# Patient Record
Sex: MALE | Race: WHITE
[De-identification: names, ages, dates, MRNs, and addresses within clinical notes are randomized per-mention and may not be internally consistent; named-entity substitution may affect disease eponyms.]

---

## 2018-05-10 ENCOUNTER — HOSPITAL ENCOUNTER (OUTPATIENT)
Dept: HOSPITAL 83 - RAD | Age: 73
Discharge: HOME | End: 2018-05-10
Attending: NURSE PRACTITIONER
Payer: COMMERCIAL

## 2018-05-10 DIAGNOSIS — M25.551: Primary | ICD-10-CM

## 2020-12-14 RX ORDER — ACETAMINOPHEN 325 MG/1
650 TABLET ORAL EVERY 6 HOURS PRN
COMMUNITY

## 2020-12-14 RX ORDER — AMLODIPINE BESYLATE 10 MG/1
10 TABLET ORAL DAILY
COMMUNITY

## 2020-12-14 RX ORDER — LISINOPRIL 40 MG/1
40 TABLET ORAL DAILY
COMMUNITY

## 2020-12-14 RX ORDER — DICLOFENAC SODIUM AND MISOPROSTOL 50; 200 MG/1; UG/1
1 TABLET, DELAYED RELEASE ORAL DAILY
COMMUNITY

## 2020-12-16 ENCOUNTER — ANESTHESIA EVENT (OUTPATIENT)
Dept: OPERATING ROOM | Age: 75
End: 2020-12-16
Payer: MEDICARE

## 2020-12-18 ENCOUNTER — PREP FOR PROCEDURE (OUTPATIENT)
Dept: OPHTHALMOLOGY | Age: 75
End: 2020-12-18

## 2020-12-18 RX ORDER — TROPICAMIDE 10 MG/ML
1 SOLUTION/ DROPS OPHTHALMIC SEE ADMIN INSTRUCTIONS
Status: CANCELLED | OUTPATIENT
Start: 2020-12-21

## 2020-12-18 RX ORDER — SODIUM CHLORIDE 9 MG/ML
INJECTION, SOLUTION INTRAVENOUS CONTINUOUS
Status: CANCELLED | OUTPATIENT
Start: 2020-12-18

## 2020-12-18 RX ORDER — SODIUM CHLORIDE 0.9 % (FLUSH) 0.9 %
10 SYRINGE (ML) INJECTION PRN
Status: CANCELLED | OUTPATIENT
Start: 2020-12-18

## 2020-12-18 RX ORDER — SODIUM CHLORIDE 0.9 % (FLUSH) 0.9 %
10 SYRINGE (ML) INJECTION EVERY 12 HOURS SCHEDULED
Status: CANCELLED | OUTPATIENT
Start: 2020-12-18

## 2020-12-18 RX ORDER — TOBRAMYCIN 3 MG/ML
1 SOLUTION/ DROPS OPHTHALMIC SEE ADMIN INSTRUCTIONS
Status: CANCELLED | OUTPATIENT
Start: 2020-12-21

## 2020-12-18 RX ORDER — PHENYLEPHRINE HYDROCHLORIDE 25 MG/ML
1 SOLUTION/ DROPS OPHTHALMIC SEE ADMIN INSTRUCTIONS
Status: CANCELLED | OUTPATIENT
Start: 2020-12-21

## 2020-12-18 RX ORDER — PROPARACAINE HYDROCHLORIDE 5 MG/ML
1 SOLUTION/ DROPS OPHTHALMIC SEE ADMIN INSTRUCTIONS
Status: CANCELLED | OUTPATIENT
Start: 2020-12-21

## 2020-12-18 ASSESSMENT — LIFESTYLE VARIABLES: SMOKING_STATUS: 0

## 2020-12-18 NOTE — ANESTHESIA PRE PROCEDURE
Department of Anesthesiology  Preprocedure Note       Name:  Agatha Estrada   Age:  70 y.o.  :  1945                                          MRN:  [id number]         Date:  2020      Surgeon: Dk Holly):  Daniella Milligan MD    Procedure: Procedure(s):  PARS PLANA VITRECTOMY, SILICONE OIL REMOVAL, 50C, MAC, LEFT EYE    Medications prior to admission:   Prior to Admission medications    Medication Sig Start Date End Date Taking? Authorizing Provider   amLODIPine (NORVASC) 10 MG tablet Take 10 mg by mouth daily   Yes Historical Provider, MD   lisinopril (PRINIVIL;ZESTRIL) 40 MG tablet Take 40 mg by mouth daily   Yes Historical Provider, MD   diclofenac-miSOPROStol (ARTHROTEC 50) 50-0.2 MG per tablet Take 1 tablet by mouth 2 times daily   Yes Historical Provider, MD   acetaminophen (TYLENOL) 325 MG tablet Take 650 mg by mouth every 6 hours as needed for Pain   Yes Historical Provider, MD       Current medications:    No current facility-administered medications for this encounter. Current Outpatient Medications   Medication Sig Dispense Refill    amLODIPine (NORVASC) 10 MG tablet Take 10 mg by mouth daily      lisinopril (PRINIVIL;ZESTRIL) 40 MG tablet Take 40 mg by mouth daily      diclofenac-miSOPROStol (ARTHROTEC 50) 50-0.2 MG per tablet Take 1 tablet by mouth 2 times daily      acetaminophen (TYLENOL) 325 MG tablet Take 650 mg by mouth every 6 hours as needed for Pain         Allergies:  No Known Allergies    Problem List:  There is no problem list on file for this patient.       Past Medical History:        Diagnosis Date    Arthritis     COPD (chronic obstructive pulmonary disease) ([facility] [address]     Hyperlipidemia     Hypertension        Past Surgical History:        Procedure Laterality Date    RETINAL DETACHMENT SURGERY Left     RETINAL DETACHMENT SURGERY Right     RETINAL DETACHMENT SURGERY Left 2020    and   done in [facility] [address] [facility] Right GI/Hepatic/Renal:             Endo/Other:    (+) : arthritis:., .                 Abdominal:       Abdomen: soft. Vascular:                                      Anesthesia Plan      MAC     ASA 3     (PCP clearance on chart)  Induction: intravenous. Plan discussed with CRNA. Juan Gómez MD   12/18/2020      Pt seen questions answered plan outlined H&P reviewed accepts MAC. Gabriele Campos M.D 12/21/2020 1118.

## 2020-12-18 NOTE — OP NOTE
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 Removal of silicone oil, left eye                               3. Endolaser, Photocoagulation left eye                               4. Silicone oil, left eye    COMPLICATIONS: NONE    OPERATIVE COURSE: After a history, physical and consent were obtained, the patient was taken to the operating room. The patient was given a bolus of IV sedating medication and a retrobulbar block was performed using a 1:1 mixture of 4% Lidocaine and 0.75% Marcaine to the operative eye. The eye was then prepped and draped in a sterile manner and lid speculum was inserted. A transscleral cannula was inserted in the infero-temporal quadrant 3.5 - 4.0 mm posterior to the limbus and a self-retaining infusion cannula was inserted. Additional cannulas were placed at the 2 and 10 O'clock positions. Using a light pipe and a viscous fluid kit, the silicone oil was removed from the eye. There was found to be a retinal detachment with sub-retinal fluid extended from 4 o'clock to 9 o'clock including the central macula. Scleral depression was performed for 360 degrees to ensure there were no other retinal tears. A drainage retinotomy was made posteriorly and a fluid-air exchange was performed using a soft-tipped cannula or the vitreous cutter. The retina flattened nicely. Endolaser photocoagulation was applied to  the retinotomy and around the entire periphery. The air was exchanged for silicone oil.  The three cannulas were removed and the eye maintained normal intraocular pressure by palpation. The lid speculum was removed and the eye was dressed with antibiotic ointment, eye patch and shield. The patient was taken from the operating room to the recovery room awake and in good condition. The patient will maintain a face down position sleeping on either side for 7 days. The patient will follow up in the office tomorrow for a post-operative check. Estimated blood loss for this procedure was less than 5ml.     Electronically signed by Luis Brown MD on 12/21/2020 at 1:46 PM

## 2020-12-18 NOTE — H&P
I have examined the patient and reviewed the history and physical from November 11, 2020 and find no relevant changes. I have reviewed with the patient and/or family the risks, benefits, and alternatives to the procedure and they have agreed to proceed.     KERA FLORES

## 2020-12-21 ENCOUNTER — HOSPITAL ENCOUNTER (OUTPATIENT)
Age: 75
Setting detail: OUTPATIENT SURGERY
Discharge: HOME | End: 2020-12-21
Attending: OPHTHALMOLOGY | Admitting: OPHTHALMOLOGY
Payer: MEDICARE

## 2020-12-21 ENCOUNTER — ANESTHESIA (OUTPATIENT)
Dept: OPERATING ROOM | Age: 75
End: 2020-12-21
Payer: MEDICARE

## 2020-12-21 VITALS
HEIGHT: 69 IN | BODY MASS INDEX: 32.58 KG/M2 | RESPIRATION RATE: 12 BRPM | HEART RATE: 68 BPM | DIASTOLIC BLOOD PRESSURE: 79 MMHG | OXYGEN SATURATION: 98 % | SYSTOLIC BLOOD PRESSURE: 149 MMHG | TEMPERATURE: 98.3 F | WEIGHT: 220 LBS

## 2020-12-21 VITALS
SYSTOLIC BLOOD PRESSURE: 143 MMHG | OXYGEN SATURATION: 99 % | RESPIRATION RATE: 17 BRPM | DIASTOLIC BLOOD PRESSURE: 73 MMHG

## 2020-12-21 PROBLEM — H33.012 RETINAL DETACHMENT OF LEFT EYE WITH SINGLE BREAK: Status: ACTIVE | Noted: 2020-12-21

## 2020-12-21 PROCEDURE — 3600000003 HC SURGERY LEVEL 3 BASE: Performed by: OPHTHALMOLOGY

## 2020-12-21 PROCEDURE — 6370000000 HC RX 637 (ALT 250 FOR IP): Performed by: OPHTHALMOLOGY

## 2020-12-21 PROCEDURE — 2500000003 HC RX 250 WO HCPCS: Performed by: OPHTHALMOLOGY

## 2020-12-21 PROCEDURE — 3700000001 HC ADD 15 MINUTES (ANESTHESIA): Performed by: OPHTHALMOLOGY

## 2020-12-21 PROCEDURE — 2580000003 HC RX 258: Performed by: ANESTHESIOLOGY

## 2020-12-21 PROCEDURE — C1814 RETINAL TAMP, SILICONE OIL: HCPCS | Performed by: OPHTHALMOLOGY

## 2020-12-21 PROCEDURE — 2709999900 HC NON-CHARGEABLE SUPPLY: Performed by: OPHTHALMOLOGY

## 2020-12-21 PROCEDURE — 7100000011 HC PHASE II RECOVERY - ADDTL 15 MIN: Performed by: OPHTHALMOLOGY

## 2020-12-21 PROCEDURE — 6360000002 HC RX W HCPCS: Performed by: NURSE ANESTHETIST, CERTIFIED REGISTERED

## 2020-12-21 PROCEDURE — 3600000013 HC SURGERY LEVEL 3 ADDTL 15MIN: Performed by: OPHTHALMOLOGY

## 2020-12-21 PROCEDURE — 3700000000 HC ANESTHESIA ATTENDED CARE: Performed by: OPHTHALMOLOGY

## 2020-12-21 PROCEDURE — 2720000010 HC SURG SUPPLY STERILE: Performed by: OPHTHALMOLOGY

## 2020-12-21 PROCEDURE — 7100000010 HC PHASE II RECOVERY - FIRST 15 MIN: Performed by: OPHTHALMOLOGY

## 2020-12-21 RX ORDER — BALANCED SALT SOLUTION 6.4; .75; .48; .3; 3.9; 1.7 MG/ML; MG/ML; MG/ML; MG/ML; MG/ML; MG/ML
SOLUTION OPHTHALMIC PRN
Status: DISCONTINUED | OUTPATIENT
Start: 2020-12-21 | End: 2020-12-21 | Stop reason: ALTCHOICE

## 2020-12-21 RX ORDER — SODIUM CHLORIDE 9 MG/ML
INJECTION, SOLUTION INTRAVENOUS CONTINUOUS
Status: DISCONTINUED | OUTPATIENT
Start: 2020-12-21 | End: 2020-12-21 | Stop reason: HOSPADM

## 2020-12-21 RX ORDER — TOBRAMYCIN 3 MG/ML
1 SOLUTION/ DROPS OPHTHALMIC SEE ADMIN INSTRUCTIONS
Status: DISCONTINUED | OUTPATIENT
Start: 2020-12-21 | End: 2020-12-21 | Stop reason: HOSPADM

## 2020-12-21 RX ORDER — TROPICAMIDE 10 MG/ML
1 SOLUTION/ DROPS OPHTHALMIC SEE ADMIN INSTRUCTIONS
Status: DISCONTINUED | OUTPATIENT
Start: 2020-12-21 | End: 2020-12-21 | Stop reason: HOSPADM

## 2020-12-21 RX ORDER — SODIUM CHLORIDE 0.9 % (FLUSH) 0.9 %
10 SYRINGE (ML) INJECTION EVERY 12 HOURS SCHEDULED
Status: DISCONTINUED | OUTPATIENT
Start: 2020-12-21 | End: 2020-12-21 | Stop reason: HOSPADM

## 2020-12-21 RX ORDER — SODIUM CHLORIDE, SODIUM LACTATE, POTASSIUM CHLORIDE, CALCIUM CHLORIDE 600; 310; 30; 20 MG/100ML; MG/100ML; MG/100ML; MG/100ML
INJECTION, SOLUTION INTRAVENOUS CONTINUOUS
Status: DISCONTINUED | OUTPATIENT
Start: 2020-12-21 | End: 2020-12-21 | Stop reason: HOSPADM

## 2020-12-21 RX ORDER — PROPARACAINE HYDROCHLORIDE 5 MG/ML
1 SOLUTION/ DROPS OPHTHALMIC SEE ADMIN INSTRUCTIONS
Status: DISCONTINUED | OUTPATIENT
Start: 2020-12-21 | End: 2020-12-21 | Stop reason: HOSPADM

## 2020-12-21 RX ORDER — TETRACAINE HYDROCHLORIDE 5 MG/ML
SOLUTION OPHTHALMIC PRN
Status: DISCONTINUED | OUTPATIENT
Start: 2020-12-21 | End: 2020-12-21 | Stop reason: ALTCHOICE

## 2020-12-21 RX ORDER — PHENYLEPHRINE HCL 2.5 %
1 DROPS OPHTHALMIC (EYE) SEE ADMIN INSTRUCTIONS
Status: DISCONTINUED | OUTPATIENT
Start: 2020-12-21 | End: 2020-12-21 | Stop reason: HOSPADM

## 2020-12-21 RX ORDER — NEOMYCIN SULFATE, POLYMYXIN B SULFATE, AND DEXAMETHASONE 3.5; 10000; 1 MG/G; [USP'U]/G; MG/G
OINTMENT OPHTHALMIC PRN
Status: DISCONTINUED | OUTPATIENT
Start: 2020-12-21 | End: 2020-12-21 | Stop reason: ALTCHOICE

## 2020-12-21 RX ORDER — FENTANYL CITRATE 50 UG/ML
INJECTION, SOLUTION INTRAMUSCULAR; INTRAVENOUS PRN
Status: DISCONTINUED | OUTPATIENT
Start: 2020-12-21 | End: 2020-12-21 | Stop reason: SDUPTHER

## 2020-12-21 RX ORDER — MIDAZOLAM HYDROCHLORIDE 1 MG/ML
INJECTION INTRAMUSCULAR; INTRAVENOUS PRN
Status: DISCONTINUED | OUTPATIENT
Start: 2020-12-21 | End: 2020-12-21 | Stop reason: SDUPTHER

## 2020-12-21 RX ORDER — SODIUM CHLORIDE 0.9 % (FLUSH) 0.9 %
10 SYRINGE (ML) INJECTION PRN
Status: DISCONTINUED | OUTPATIENT
Start: 2020-12-21 | End: 2020-12-21 | Stop reason: HOSPADM

## 2020-12-21 RX ADMIN — MIDAZOLAM 2 MG: 1 INJECTION INTRAMUSCULAR; INTRAVENOUS at 13:04

## 2020-12-21 RX ADMIN — FENTANYL CITRATE 25 MCG: 50 INJECTION, SOLUTION INTRAMUSCULAR; INTRAVENOUS at 13:12

## 2020-12-21 RX ADMIN — FENTANYL CITRATE 25 MCG: 50 INJECTION, SOLUTION INTRAMUSCULAR; INTRAVENOUS at 13:17

## 2020-12-21 RX ADMIN — FENTANYL CITRATE 50 MCG: 50 INJECTION, SOLUTION INTRAMUSCULAR; INTRAVENOUS at 13:08

## 2020-12-21 RX ADMIN — SODIUM CHLORIDE, POTASSIUM CHLORIDE, SODIUM LACTATE AND CALCIUM CHLORIDE: 600; 310; 30; 20 INJECTION, SOLUTION INTRAVENOUS at 13:02

## 2020-12-21 RX ADMIN — SODIUM CHLORIDE, POTASSIUM CHLORIDE, SODIUM LACTATE AND CALCIUM CHLORIDE: 600; 310; 30; 20 INJECTION, SOLUTION INTRAVENOUS at 10:54

## 2020-12-21 ASSESSMENT — PULMONARY FUNCTION TESTS
PIF_VALUE: 0

## 2020-12-21 ASSESSMENT — PAIN SCALES - GENERAL
PAINLEVEL_OUTOF10: 0
PAINLEVEL_OUTOF10: 0

## 2020-12-21 ASSESSMENT — PAIN - FUNCTIONAL ASSESSMENT: PAIN_FUNCTIONAL_ASSESSMENT: 0-10

## 2020-12-21 NOTE — ANESTHESIA POSTPROCEDURE EVALUATION
Department of Anesthesiology  Postprocedure Note    Patient: Catherine Kamara  MRN: [id number]  YOB: 1945  Date of evaluation: 12/21/2020  Time:  1:54 PM     Procedure Summary     Date: 12/21/20 Room / Location: [address] [place] [address] 02 / [address]    Anesthesia Start: 0519 Anesthesia Stop: 4198    Procedure: PARS PLANA VITRECTOMY, SILICONE OIL REMOVAL, 79Y, MAC, LEFT EYE (Left Eye) Diagnosis: (RETINA DETACHMENT LEFT EYE)    Surgeons: Luciano Latham MD Responsible Provider: April Patel MD    Anesthesia Type: MAC ASA Status: 3          Anesthesia Type: MAC    Yovani Phase I: Yovani Score: 10    Yovani Phase II:      Last vitals: Reviewed and per EMR flowsheets.        Anesthesia Post Evaluation    Patient location during evaluation: PACU  Patient participation: complete - patient participated  Level of consciousness: awake  Pain score: 3  Airway patency: patent  Nausea & Vomiting: no nausea  Complications: no  Cardiovascular status: blood pressure returned to baseline  Respiratory status: acceptable  Hydration status: euvolemic

## 2021-12-14 RX ORDER — ATORVASTATIN CALCIUM 40 MG/1
40 TABLET, FILM COATED ORAL DAILY
COMMUNITY

## 2021-12-15 ENCOUNTER — PREP FOR PROCEDURE (OUTPATIENT)
Dept: OPHTHALMOLOGY | Age: 76
End: 2021-12-15

## 2021-12-15 RX ORDER — PHENYLEPHRINE HCL 2.5 %
1 DROPS OPHTHALMIC (EYE) SEE ADMIN INSTRUCTIONS
Status: CANCELLED | OUTPATIENT
Start: 2021-12-20

## 2021-12-15 RX ORDER — OFLOXACIN 3 MG/ML
1 SOLUTION/ DROPS OPHTHALMIC ONCE
Status: CANCELLED | OUTPATIENT
Start: 2021-12-20

## 2021-12-15 RX ORDER — TROPICAMIDE 10 MG/ML
1 SOLUTION/ DROPS OPHTHALMIC SEE ADMIN INSTRUCTIONS
Status: CANCELLED | OUTPATIENT
Start: 2021-12-20

## 2021-12-15 RX ORDER — SODIUM CHLORIDE 0.9 % (FLUSH) 0.9 %
5-40 SYRINGE (ML) INJECTION PRN
Status: CANCELLED | OUTPATIENT
Start: 2021-12-15

## 2021-12-15 RX ORDER — PROPARACAINE HYDROCHLORIDE 5 MG/ML
1 SOLUTION/ DROPS OPHTHALMIC SEE ADMIN INSTRUCTIONS
Status: CANCELLED | OUTPATIENT
Start: 2021-12-20

## 2021-12-15 RX ORDER — SODIUM CHLORIDE 0.9 % (FLUSH) 0.9 %
5-40 SYRINGE (ML) INJECTION EVERY 12 HOURS SCHEDULED
Status: CANCELLED | OUTPATIENT
Start: 2021-12-15

## 2021-12-15 RX ORDER — SODIUM CHLORIDE 9 MG/ML
25 INJECTION, SOLUTION INTRAVENOUS PRN
Status: CANCELLED | OUTPATIENT
Start: 2021-12-15

## 2021-12-17 ENCOUNTER — ANESTHESIA EVENT (OUTPATIENT)
Dept: OPERATING ROOM | Age: 76
End: 2021-12-17
Payer: MEDICARE

## 2021-12-17 ASSESSMENT — LIFESTYLE VARIABLES: SMOKING_STATUS: 0

## 2021-12-17 ASSESSMENT — COPD QUESTIONNAIRES: CAT_SEVERITY: MILD

## 2021-12-17 NOTE — ANESTHESIA PRE PROCEDURE
Department of Anesthesiology  Preprocedure Note       Name:  Audrey Scruggs   Age:  78 y.o.  :  1945                                          MRN:  [id number]         Date:  2021      Surgeon: Dionna Blank):  Magui Sidhu MD    Procedure: Procedure(s):  PARS PLANA VITRECTOMY REMOVAL OF SILICONE OIL 20G MAC LEFT EYE (54813)    Medications prior to admission:   Prior to Admission medications    Medication Sig Start Date End Date Taking? Authorizing Provider   Fluticasone Furoate-Vilanterol (BREO ELLIPTA IN) Inhale into the lungs daily    Historical Provider, MD   atorvastatin (LIPITOR) 40 MG tablet Take 40 mg by mouth daily    Historical Provider, MD   amLODIPine (NORVASC) 10 MG tablet Take 10 mg by mouth daily    Historical Provider, MD   lisinopril (PRINIVIL;ZESTRIL) 40 MG tablet Take 40 mg by mouth daily    Historical Provider, MD   diclofenac-miSOPROStol (ARTHROTEC 50) 50-0.2 MG per tablet Take 1 tablet by mouth daily     Historical Provider, MD   acetaminophen (TYLENOL) 325 MG tablet Take 650 mg by mouth every 6 hours as needed for Pain    Historical Provider, MD       Current medications:    Current Outpatient Medications   Medication Sig Dispense Refill    Fluticasone Furoate-Vilanterol (BREO ELLIPTA IN) Inhale into the lungs daily      atorvastatin (LIPITOR) 40 MG tablet Take 40 mg by mouth daily      amLODIPine (NORVASC) 10 MG tablet Take 10 mg by mouth daily      lisinopril (PRINIVIL;ZESTRIL) 40 MG tablet Take 40 mg by mouth daily      diclofenac-miSOPROStol (ARTHROTEC 50) 50-0.2 MG per tablet Take 1 tablet by mouth daily       acetaminophen (TYLENOL) 325 MG tablet Take 650 mg by mouth every 6 hours as needed for Pain       No current facility-administered medications for this visit.        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 [address] Left     RETINAL DETACHMENT SURGERY Right 2019    RETINAL DETACHMENT SURGERY Left 2020    and   done in [address] [place], [place] [address]e.    in arm      VITRECTOMY Left 2020       Social History:    Social History     Tobacco Use    Smoking status: Former Smoker     Quit date:      Years since quittin.9    Smokeless tobacco: Current User     Types: Snuff   Substance Use Topics    Alcohol use: Yes     Comment: occ                                Ready to quit: Not Answered  Counseling given: Not Answered      Vital Signs (Current): There were no vitals filed for this visit. BP Readings from Last 3 Encounters:   20 (!) 143/73   20 (!) 149/79       NPO Status:  >8.H                                                                               BMI:   Wt Readings from Last 3 Encounters:   20 220 lb (99.8 kg)     There is no height or weight on file to calculate BMI.    CBC: No results found for: WBC, RBC, HGB, HCT, MCV, RDW, PLT    CMP: No results found for: NA, K, CL, CO2, BUN, CREATININE, GFRAA, AGRATIO, LABGLOM, GLUCOSE, PROT, CALCIUM, BILITOT, ALKPHOS, AST, ALT    POC Tests: No results for input(s): POCGLU, POCNA, POCK, POCCL, POCBUN, POCHEMO, POCHCT in the last 72 hours.     Coags: No results found for: PROTIME, INR, APTT    HCG (If Applicable): No results found for: PREGTESTUR, PREGSERUM, HCG, HCGQUANT     ABGs: No results found for: PHART, PO2ART, UER3OZZ, RJY1HCT, BEART, H3PTJCBY     Type & Screen (If Applicable):  No results found for: LABABO, LABRH    Drug/Infectious Status (If Applicable):  No results found for: HIV, HEPCAB    COVID-19 Screening (If Applicable): No results found for: COVID19      Anesthesia Evaluation  Patient summary reviewed and Nursing notes reviewed no history of anesthetic complications:   Airway: Mallampati: III  TM distance: >3 FB   Neck ROM: full  Mouth opening: > = 3 FB Dental:          Pulmonary: breath sounds clear to auscultation  (+) COPD: mild,  sleep apnea: on CPAP,      (-) not a current smoker (ex smoker)                           Cardiovascular:  Exercise tolerance: good (>4 METS),   (+) hypertension: mild, hyperlipidemia        Rhythm: regular  Rate: normal           Beta Blocker:  Not on Beta Blocker      ROS comment: Cleared by PCP     Neuro/Psych:   Negative Neuro/Psych ROS              GI/Hepatic/Renal: Neg GI/Hepatic/Renal ROS            Endo/Other:    (+) : arthritis: OA., . Pt had no PAT visit       Abdominal:   (+) obese,     Abdomen: soft. Vascular: negative vascular ROS. Other Findings:             Anesthesia Plan      MAC     ASA 3     (Note copied from a previous encounter with the appropriate addendums and changes)  Induction: intravenous. Anesthetic plan and risks discussed with patient. Plan discussed with CRNA. Attending anesthesiologist reviewed and agrees with Preprocedure content            818 Serg Gunter, DO   12/17/2021      History, data, and pertinent studies from chart review. Above represents information available via the shared medical record including previous anesthetic, medication, and allergy history.   Confirmation of above and final disposition per DOS anesthesiologist.    [facility] Serg Gunter, DO  Staff Anesthesiologist  December 17, 2021  7:07 AM

## 2021-12-20 ENCOUNTER — ANESTHESIA (OUTPATIENT)
Dept: OPERATING ROOM | Age: 76
End: 2021-12-20
Payer: MEDICARE

## 2021-12-20 ENCOUNTER — HOSPITAL ENCOUNTER (OUTPATIENT)
Age: 76
Setting detail: OUTPATIENT SURGERY
Discharge: HOME | End: 2021-12-20
Attending: OPHTHALMOLOGY | Admitting: OPHTHALMOLOGY
Payer: MEDICARE

## 2021-12-20 VITALS
SYSTOLIC BLOOD PRESSURE: 137 MMHG | RESPIRATION RATE: 16 BRPM | HEART RATE: 71 BPM | OXYGEN SATURATION: 98 % | WEIGHT: 242 LBS | TEMPERATURE: 98 F | BODY MASS INDEX: 34.65 KG/M2 | HEIGHT: 70 IN | DIASTOLIC BLOOD PRESSURE: 67 MMHG

## 2021-12-20 VITALS
SYSTOLIC BLOOD PRESSURE: 159 MMHG | RESPIRATION RATE: 19 BRPM | OXYGEN SATURATION: 99 % | DIASTOLIC BLOOD PRESSURE: 80 MMHG

## 2021-12-20 PROCEDURE — 3600000012 HC SURGERY LEVEL 2 ADDTL 15MIN: Performed by: OPHTHALMOLOGY

## 2021-12-20 PROCEDURE — 6360000002 HC RX W HCPCS: Performed by: NURSE ANESTHETIST, CERTIFIED REGISTERED

## 2021-12-20 PROCEDURE — 6370000000 HC RX 637 (ALT 250 FOR IP): Performed by: OPHTHALMOLOGY

## 2021-12-20 PROCEDURE — 7100000011 HC PHASE II RECOVERY - ADDTL 15 MIN: Performed by: OPHTHALMOLOGY

## 2021-12-20 PROCEDURE — 7100000010 HC PHASE II RECOVERY - FIRST 15 MIN: Performed by: OPHTHALMOLOGY

## 2021-12-20 PROCEDURE — 3700000000 HC ANESTHESIA ATTENDED CARE: Performed by: OPHTHALMOLOGY

## 2021-12-20 PROCEDURE — 2500000003 HC RX 250 WO HCPCS: Performed by: OPHTHALMOLOGY

## 2021-12-20 PROCEDURE — 2580000003 HC RX 258: Performed by: ANESTHESIOLOGY

## 2021-12-20 PROCEDURE — 2709999900 HC NON-CHARGEABLE SUPPLY: Performed by: OPHTHALMOLOGY

## 2021-12-20 PROCEDURE — 3700000001 HC ADD 15 MINUTES (ANESTHESIA): Performed by: OPHTHALMOLOGY

## 2021-12-20 PROCEDURE — 3600000002 HC SURGERY LEVEL 2 BASE: Performed by: OPHTHALMOLOGY

## 2021-12-20 RX ORDER — MIDAZOLAM HYDROCHLORIDE 1 MG/ML
INJECTION INTRAMUSCULAR; INTRAVENOUS PRN
Status: DISCONTINUED | OUTPATIENT
Start: 2021-12-20 | End: 2021-12-20 | Stop reason: SDUPTHER

## 2021-12-20 RX ORDER — SODIUM CHLORIDE, SODIUM LACTATE, POTASSIUM CHLORIDE, CALCIUM CHLORIDE 600; 310; 30; 20 MG/100ML; MG/100ML; MG/100ML; MG/100ML
INJECTION, SOLUTION INTRAVENOUS CONTINUOUS
Status: DISCONTINUED | OUTPATIENT
Start: 2021-12-20 | End: 2021-12-20 | Stop reason: HOSPADM

## 2021-12-20 RX ORDER — FENTANYL CITRATE 50 UG/ML
50 INJECTION, SOLUTION INTRAMUSCULAR; INTRAVENOUS EVERY 5 MIN PRN
Status: DISCONTINUED | OUTPATIENT
Start: 2021-12-20 | End: 2021-12-20 | Stop reason: HOSPADM

## 2021-12-20 RX ORDER — PHENYLEPHRINE HCL 2.5 %
1 DROPS OPHTHALMIC (EYE) SEE ADMIN INSTRUCTIONS
Status: DISCONTINUED | OUTPATIENT
Start: 2021-12-20 | End: 2021-12-20 | Stop reason: HOSPADM

## 2021-12-20 RX ORDER — SODIUM CHLORIDE 0.9 % (FLUSH) 0.9 %
5-40 SYRINGE (ML) INJECTION PRN
Status: DISCONTINUED | OUTPATIENT
Start: 2021-12-20 | End: 2021-12-20 | Stop reason: HOSPADM

## 2021-12-20 RX ORDER — HYDRALAZINE HYDROCHLORIDE 20 MG/ML
5 INJECTION INTRAMUSCULAR; INTRAVENOUS EVERY 10 MIN PRN
Status: DISCONTINUED | OUTPATIENT
Start: 2021-12-20 | End: 2021-12-20 | Stop reason: HOSPADM

## 2021-12-20 RX ORDER — TROPICAMIDE 10 MG/ML
1 SOLUTION/ DROPS OPHTHALMIC SEE ADMIN INSTRUCTIONS
Status: DISCONTINUED | OUTPATIENT
Start: 2021-12-20 | End: 2021-12-20 | Stop reason: HOSPADM

## 2021-12-20 RX ORDER — MORPHINE SULFATE 2 MG/ML
1 INJECTION, SOLUTION INTRAMUSCULAR; INTRAVENOUS EVERY 5 MIN PRN
Status: DISCONTINUED | OUTPATIENT
Start: 2021-12-20 | End: 2021-12-20 | Stop reason: HOSPADM

## 2021-12-20 RX ORDER — TETRACAINE HYDROCHLORIDE 5 MG/ML
SOLUTION OPHTHALMIC PRN
Status: DISCONTINUED | OUTPATIENT
Start: 2021-12-20 | End: 2021-12-20 | Stop reason: ALTCHOICE

## 2021-12-20 RX ORDER — LABETALOL HYDROCHLORIDE 5 MG/ML
5 INJECTION, SOLUTION INTRAVENOUS EVERY 10 MIN PRN
Status: DISCONTINUED | OUTPATIENT
Start: 2021-12-20 | End: 2021-12-20 | Stop reason: HOSPADM

## 2021-12-20 RX ORDER — SODIUM CHLORIDE 9 MG/ML
25 INJECTION, SOLUTION INTRAVENOUS PRN
Status: DISCONTINUED | OUTPATIENT
Start: 2021-12-20 | End: 2021-12-20 | Stop reason: HOSPADM

## 2021-12-20 RX ORDER — NEOMYCIN SULFATE, POLYMYXIN B SULFATE, AND DEXAMETHASONE 3.5; 10000; 1 MG/G; [USP'U]/G; MG/G
OINTMENT OPHTHALMIC PRN
Status: DISCONTINUED | OUTPATIENT
Start: 2021-12-20 | End: 2021-12-20 | Stop reason: ALTCHOICE

## 2021-12-20 RX ORDER — MEPERIDINE HYDROCHLORIDE 25 MG/ML
12.5 INJECTION INTRAMUSCULAR; INTRAVENOUS; SUBCUTANEOUS EVERY 5 MIN PRN
Status: DISCONTINUED | OUTPATIENT
Start: 2021-12-20 | End: 2021-12-20 | Stop reason: HOSPADM

## 2021-12-20 RX ORDER — HYDROCODONE BITARTRATE AND ACETAMINOPHEN 5; 325 MG/1; MG/1
1 TABLET ORAL PRN
Status: DISCONTINUED | OUTPATIENT
Start: 2021-12-20 | End: 2021-12-20 | Stop reason: HOSPADM

## 2021-12-20 RX ORDER — FENTANYL CITRATE 50 UG/ML
INJECTION, SOLUTION INTRAMUSCULAR; INTRAVENOUS PRN
Status: DISCONTINUED | OUTPATIENT
Start: 2021-12-20 | End: 2021-12-20 | Stop reason: SDUPTHER

## 2021-12-20 RX ORDER — BALANCED SALT SOLUTION 6.4; .75; .48; .3; 3.9; 1.7 MG/ML; MG/ML; MG/ML; MG/ML; MG/ML; MG/ML
SOLUTION OPHTHALMIC PRN
Status: DISCONTINUED | OUTPATIENT
Start: 2021-12-20 | End: 2021-12-20 | Stop reason: ALTCHOICE

## 2021-12-20 RX ORDER — PROPARACAINE HYDROCHLORIDE 5 MG/ML
1 SOLUTION/ DROPS OPHTHALMIC SEE ADMIN INSTRUCTIONS
Status: COMPLETED | OUTPATIENT
Start: 2021-12-20 | End: 2021-12-20

## 2021-12-20 RX ORDER — HYDROCODONE BITARTRATE AND ACETAMINOPHEN 5; 325 MG/1; MG/1
2 TABLET ORAL PRN
Status: DISCONTINUED | OUTPATIENT
Start: 2021-12-20 | End: 2021-12-20 | Stop reason: HOSPADM

## 2021-12-20 RX ORDER — DIPHENHYDRAMINE HYDROCHLORIDE 50 MG/ML
12.5 INJECTION INTRAMUSCULAR; INTRAVENOUS
Status: DISCONTINUED | OUTPATIENT
Start: 2021-12-20 | End: 2021-12-20 | Stop reason: HOSPADM

## 2021-12-20 RX ORDER — OFLOXACIN 3 MG/ML
1 SOLUTION/ DROPS OPHTHALMIC ONCE
Status: COMPLETED | OUTPATIENT
Start: 2021-12-20 | End: 2021-12-20

## 2021-12-20 RX ORDER — PROMETHAZINE HYDROCHLORIDE 25 MG/ML
25 INJECTION, SOLUTION INTRAMUSCULAR; INTRAVENOUS
Status: DISCONTINUED | OUTPATIENT
Start: 2021-12-20 | End: 2021-12-20 | Stop reason: HOSPADM

## 2021-12-20 RX ORDER — SODIUM CHLORIDE 0.9 % (FLUSH) 0.9 %
5-40 SYRINGE (ML) INJECTION EVERY 12 HOURS SCHEDULED
Status: DISCONTINUED | OUTPATIENT
Start: 2021-12-20 | End: 2021-12-20 | Stop reason: HOSPADM

## 2021-12-20 RX ADMIN — OFLOXACIN 1 DROP: 3 SOLUTION OPHTHALMIC at 08:53

## 2021-12-20 RX ADMIN — MIDAZOLAM 1 MG: 1 INJECTION INTRAMUSCULAR; INTRAVENOUS at 10:19

## 2021-12-20 RX ADMIN — PROPARACAINE HYDROCHLORIDE 1 DROP: 5 SOLUTION/ DROPS OPHTHALMIC at 08:30

## 2021-12-20 RX ADMIN — SODIUM CHLORIDE, POTASSIUM CHLORIDE, SODIUM LACTATE AND CALCIUM CHLORIDE: 600; 310; 30; 20 INJECTION, SOLUTION INTRAVENOUS at 08:52

## 2021-12-20 RX ADMIN — TROPICAMIDE 1 DROP: 10 SOLUTION/ DROPS OPHTHALMIC at 08:30

## 2021-12-20 RX ADMIN — PHENYLEPHRINE HYDROCHLORIDE 1 DROP: 25 SOLUTION/ DROPS OPHTHALMIC at 08:30

## 2021-12-20 RX ADMIN — FENTANYL CITRATE 50 MCG: 50 INJECTION, SOLUTION INTRAMUSCULAR; INTRAVENOUS at 10:24

## 2021-12-20 ASSESSMENT — PAIN - FUNCTIONAL ASSESSMENT: PAIN_FUNCTIONAL_ASSESSMENT: 0-10

## 2021-12-20 ASSESSMENT — PAIN SCALES - GENERAL
PAINLEVEL_OUTOF10: 0

## 2021-12-20 NOTE — ANESTHESIA POSTPROCEDURE EVALUATION
Department of Anesthesiology  Postprocedure Note    Patient: Kerrie Sawyer  MRN: [id number]  YOB: 1945  Date of evaluation: 12/20/2021  Time:  11:16 AM     Procedure Summary     Date: 12/20/21 Room / Location: [address] [place] [address] 02 / [address] [facility]    Anesthesia Start: 5193 Anesthesia Stop: 3977    Procedure: PARS PLANA VITRECTOMY REMOVAL OF SILICONE OIL 62S MAC LEFT EYE (94850) (Left ) Diagnosis: (RHEG RETINA DETACHEMENT LEFT EYE)    Surgeons: Wanda Alberts MD Responsible Provider: Jana Heller MD    Anesthesia Type: MAC ASA Status: 3          Anesthesia Type: MAC    Yovani Phase I:      Yovani Phase II: Yovani Score: 10    Last vitals: Reviewed and per EMR flowsheets.        Anesthesia Post Evaluation    Patient location during evaluation: PACU  Patient participation: complete - patient participated  Level of consciousness: awake and alert  Airway patency: patent  Nausea & Vomiting: no nausea and no vomiting  Complications: no  Cardiovascular status: hemodynamically stable  Respiratory status: room air and spontaneous ventilation  Hydration status: stable

## 2021-12-20 NOTE — H&P
I have examined the patient and reviewed the history and physical from 12/1/21 and find no relevant changes. I have reviewed with the patient and/or family the risks, benefits, and alternatives to the procedure and they have agreed to proceed.     Shadia Sheehan MD

## 2022-04-21 ENCOUNTER — PREP FOR PROCEDURE (OUTPATIENT)
Dept: OPHTHALMOLOGY | Age: 77
End: 2022-04-21

## 2022-04-21 RX ORDER — PROPARACAINE HYDROCHLORIDE 5 MG/ML
1 SOLUTION/ DROPS OPHTHALMIC SEE ADMIN INSTRUCTIONS
Status: CANCELLED | OUTPATIENT
Start: 2022-04-25

## 2022-04-21 RX ORDER — TROPICAMIDE 10 MG/ML
1 SOLUTION/ DROPS OPHTHALMIC SEE ADMIN INSTRUCTIONS
Status: CANCELLED | OUTPATIENT
Start: 2022-04-25

## 2022-04-21 RX ORDER — OFLOXACIN 3 MG/ML
1 SOLUTION/ DROPS OPHTHALMIC ONCE
Status: CANCELLED | OUTPATIENT
Start: 2022-04-25

## 2022-04-21 RX ORDER — SODIUM CHLORIDE 9 MG/ML
INJECTION, SOLUTION INTRAVENOUS PRN
Status: CANCELLED | OUTPATIENT
Start: 2022-04-21

## 2022-04-21 RX ORDER — SODIUM CHLORIDE 0.9 % (FLUSH) 0.9 %
5-40 SYRINGE (ML) INJECTION EVERY 12 HOURS SCHEDULED
Status: CANCELLED | OUTPATIENT
Start: 2022-04-21

## 2022-04-21 RX ORDER — SODIUM CHLORIDE 0.9 % (FLUSH) 0.9 %
5-40 SYRINGE (ML) INJECTION PRN
Status: CANCELLED | OUTPATIENT
Start: 2022-04-21

## 2022-04-21 RX ORDER — PHENYLEPHRINE HCL 2.5 %
1 DROPS OPHTHALMIC (EYE) SEE ADMIN INSTRUCTIONS
Status: CANCELLED | OUTPATIENT
Start: 2022-04-25

## 2022-04-21 NOTE — OP NOTE
Operative Note      Patient: Yesenia Black  YOB: 1945  MRN: [id number]    Date of Procedure: 4/25/2022  Assistant: Cee Sevilla PA-C    Preoperative Diagnosis: Aphakia, Left eye    Postoperative Diagnosis: Same    Procedure Performed:  1. 25g Pars Plana Vitrectomy, Left eye                                   2. Peripheral Iridectomy, Left eye                                   3. Insertion of Anterior Chamber lens, Left eye    Complications: None  Blood Loss: <5ml  Anesthesia Used: Monitored Anesthesia Care    Operative Course: After history, physical, and consent was obtained, the patient was taken to the operating room and given a bolus of IV sedating medication. The patient then received a retrobulbar injection of a solution that contained Lidocaine and bupivacaine. The operative eye was then prepped and draped in the usual sterile fashion. Lid speculum was placed between the eyelids. The transscleral cannulas were inserted in the usual location, two temporally and one superonasally at 3 to 3.5 mm posterior to the limbus. An infusion cannula was inserted inferotemporally. The light pipe and vitrector handpiece were inserted in the eye and a core vitrectomy was performed. A posterior hyaloid separation was confirmed. A keratome blade was used to create a three planar corneal incision superior to the limbus and was extended to 6mm after measuring with callipers. Occucoat was then instilled into the eye. No retinal holes or detachments were noted. Miostat was then instilled into the eye, a Peripheral Iridectomy was then created at the 12:00 position. Occucoat was then re-instilled into the anterior chamber and a 17.0  MTA3UO was placed into the eye and rotated such that the haptics were temporal and nasal away from the corneal incision. The corneal sutures were placed superiorly and the eye was found to be water tight. The transscleral cannulas were removed and the wounds were self-sealing.  The eye was dressed with Maxitrol ointment, a pressure patch and Alvarado shield. The patient was taken from the operating room to recovery awake and in good condition. The patient will follow up for a postoperative check in the office.

## 2022-04-22 ENCOUNTER — ANESTHESIA EVENT (OUTPATIENT)
Dept: OPERATING ROOM | Age: 77
End: 2022-04-22
Payer: MEDICARE

## 2022-04-22 ASSESSMENT — LIFESTYLE VARIABLES: SMOKING_STATUS: 0

## 2022-04-22 ASSESSMENT — COPD QUESTIONNAIRES: CAT_SEVERITY: MILD

## 2022-04-22 NOTE — ANESTHESIA PRE PROCEDURE
Department of Anesthesiology  Preprocedure Note       Name:  Balwinder Frances   Age:  78 y.o.  :  1945                                          MRN:  [id number]         Date:  2022      Surgeon: Vernon Ortega):  Alia Lenz MD    Procedure: Procedure(s):  PARS PLANA VITRECTOMY, ISERTION OF SULCUS LENS VERSE ACIOL, 25G, MAC, LEFT EYE (Lens needed is +17.0 MTA2U0 ACIOL) (CPT 98864)    Medications prior to admission:   Prior to Admission medications    Medication Sig Start Date End Date Taking?  Authorizing Provider   Coenzyme Q10 (CO Q 10 PO) Take 100 mg by mouth   Yes Historical Provider, MD   Fluticasone Furoate-Vilanterol (BREO ELLIPTA IN) Inhale into the lungs daily    Historical Provider, MD   atorvastatin (LIPITOR) 40 MG tablet Take 40 mg by mouth daily    Historical Provider, MD   amLODIPine (NORVASC) 10 MG tablet Take 10 mg by mouth daily    Historical Provider, MD   lisinopril (PRINIVIL;ZESTRIL) 40 MG tablet Take 40 mg by mouth daily    Historical Provider, MD   diclofenac-miSOPROStol (ARTHROTEC 50) 50-0.2 MG per tablet Take 1 tablet by mouth daily     Historical Provider, MD   acetaminophen (TYLENOL) 325 MG tablet Take 650 mg by mouth every 6 hours as needed for Pain    Historical Provider, MD       Current medications:    Current Facility-Administered Medications   Medication Dose Route Frequency Provider Last Rate Last Admin    lactated ringers infusion   IntraVENous Continuous John Bliss MD        0.9 % sodium chloride infusion   IntraVENous PRN Alia Lenz MD        phenylephrine (MYDFRIN) 2.5 % ophthalmic solution 1 drop  1 drop Left Eye See Admin Instructions Alia Lenz MD   1 drop at 22 0757    sodium chloride flush 0.9 % injection 5-40 mL  5-40 mL IntraVENous 2 times per day Alia Lenz MD        sodium chloride flush 0.9 % injection 5-40 mL  5-40 mL IntraVENous PRN Alia Lenz MD        tropicamide (MYDRIACYL) 1 % ophthalmic solution 1 drop  1 drop Left Eye See Samaria Shaw MD   1 drop at 22 0757       Allergies:  No Known Allergies    Problem List:    Patient Active Problem List   Diagnosis Code    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    in arm      VITRECTOMY Left 2020    VITRECTOMY Left 2021    PARS PLANA VITRECTOMY REMOVAL OF SILICONE OIL 09M MAC LEFT EYE (84083) performed by Jaja Fajardo MD at [address] History:    Social History     Tobacco Use    Smoking status: Former Smoker     Quit date:      Years since quittin.3    Smokeless tobacco: Current User     Types: Snuff   Substance Use Topics    Alcohol use: Yes     Comment: occ                                Ready to quit: Not Answered  Counseling given: Not Answered      Vital Signs (Current):   Vitals:    22 1134 22 0739   BP:  (!) 159/70   Pulse:  67   Resp:  16   Temp:  97.6 °F (36.4 °C)   TempSrc:  Temporal   SpO2:  96%   Weight: 240 lb (108.9 kg) 237 lb 9.6 oz (107.8 kg)   Height: 5' 10\" (1.778 m) 5' 10\" (1.778 m)                                              BP Readings from Last 3 Encounters:   22 (!) 159/70   21 (!) 159/80   21 137/67       NPO Status: Time of last liquid consumption: 0600 (sips with meds)>8. H                        Time of last solid consumption: 1800 Date of last liquid consumption: 04/25/22                        Date of last solid food consumption: 04/24/22    BMI:   Wt Readings from Last 3 Encounters:   04/25/22 237 lb 9.6 oz (107.8 kg)   12/14/21 242 lb (109.8 kg)   12/14/20 220 lb (99.8 kg)     Body mass index is 34.09 kg/m². CBC: No results found for: WBC, RBC, HGB, HCT, MCV, RDW, PLT    CMP: No results found for: NA, K, CL, CO2, BUN, CREATININE, GFRAA, AGRATIO, LABGLOM, GLUCOSE, GLU, PROT, CALCIUM, BILITOT, ALKPHOS, AST, ALT    POC Tests: No results for input(s): POCGLU, POCNA, POCK, POCCL, POCBUN, POCHEMO, POCHCT in the last 72 hours. Coags: No results found for: PROTIME, INR, APTT    HCG (If Applicable): No results found for: PREGTESTUR, PREGSERUM, HCG, HCGQUANT     ABGs: No results found for: PHART, PO2ART, AZH1ZAL, MMZ5PPK, BEART, Q3SFSZEF     Type & Screen (If Applicable):  No results found for: LABABO, LABRH    Drug/Infectious Status (If Applicable):  No results found for: HIV, HEPCAB    COVID-19 Screening (If Applicable): No results found for: COVID19      Anesthesia Evaluation  Patient summary reviewed and Nursing notes reviewed no history of anesthetic complications:   Airway: Mallampati: III  TM distance: >3 FB   Neck ROM: full  Mouth opening: > = 3 FB Dental:          Pulmonary: breath sounds clear to auscultation  (+) COPD: mild,  sleep apnea: on CPAP,      (-) not a current smoker (ex smoker)                           Cardiovascular:  Exercise tolerance: good (>4 METS),   (+) hypertension: mild, hyperlipidemia        Rhythm: regular  Rate: normal           Beta Blocker:  Not on Beta Blocker      ROS comment: Cleared by PCP     Neuro/Psych:   Negative Neuro/Psych ROS              GI/Hepatic/Renal: Neg GI/Hepatic/Renal ROS            Endo/Other:    (+) : arthritis: OA., . Pt had no PAT visit       Abdominal:   (+) obese,     Abdomen: soft. Vascular: negative vascular ROS.          Other Findings: Anesthesia Plan      MAC     ASA 3       Induction: intravenous. Anesthetic plan and risks discussed with patient. Plan discussed with CRNA. PAT Chart Review:  Chart reviewed per routine by Nan Almaraz MD.  Above represents information available via shared medical record including previous anesthesia history, drug and allergy history. Confirmation of above and final plan per Day of Surgery (DOS) anesthesiologist.    DOS STAFF ADDENDUM:    Pt seen and examined, chart reviewed (including anesthesia, drug and allergy history). Anesthetic plan, risks, benefits, alternatives, and personnel involved discussed with patient. Patient verbalized an understanding and agrees to proceed. Plan discussed with care team members and agreed upon.     Nan Almaraz MD  Staff Anesthesiologist  7:59 AM  Nan Almaraz MD   4/25/2022

## 2022-04-25 ENCOUNTER — HOSPITAL ENCOUNTER (OUTPATIENT)
Age: 77
Setting detail: OUTPATIENT SURGERY
Discharge: HOME | End: 2022-04-25
Attending: OPHTHALMOLOGY | Admitting: OPHTHALMOLOGY
Payer: MEDICARE

## 2022-04-25 ENCOUNTER — ANESTHESIA (OUTPATIENT)
Dept: OPERATING ROOM | Age: 77
End: 2022-04-25
Payer: MEDICARE

## 2022-04-25 VITALS
RESPIRATION RATE: 16 BRPM | TEMPERATURE: 98.6 F | DIASTOLIC BLOOD PRESSURE: 69 MMHG | OXYGEN SATURATION: 100 % | SYSTOLIC BLOOD PRESSURE: 134 MMHG

## 2022-04-25 VITALS
BODY MASS INDEX: 34.01 KG/M2 | TEMPERATURE: 97.6 F | HEIGHT: 70 IN | HEART RATE: 60 BPM | DIASTOLIC BLOOD PRESSURE: 69 MMHG | SYSTOLIC BLOOD PRESSURE: 160 MMHG | RESPIRATION RATE: 16 BRPM | OXYGEN SATURATION: 97 % | WEIGHT: 237.6 LBS

## 2022-04-25 PROBLEM — H27.02 APHAKIA OF EYE, LEFT: Status: ACTIVE | Noted: 2022-04-25

## 2022-04-25 PROCEDURE — 3600000013 HC SURGERY LEVEL 3 ADDTL 15MIN: Performed by: OPHTHALMOLOGY

## 2022-04-25 PROCEDURE — 3600000003 HC SURGERY LEVEL 3 BASE: Performed by: OPHTHALMOLOGY

## 2022-04-25 PROCEDURE — 6370000000 HC RX 637 (ALT 250 FOR IP): Performed by: OPHTHALMOLOGY

## 2022-04-25 PROCEDURE — 2500000003 HC RX 250 WO HCPCS: Performed by: OPHTHALMOLOGY

## 2022-04-25 PROCEDURE — 2709999900 HC NON-CHARGEABLE SUPPLY: Performed by: OPHTHALMOLOGY

## 2022-04-25 PROCEDURE — 7100000010 HC PHASE II RECOVERY - FIRST 15 MIN: Performed by: OPHTHALMOLOGY

## 2022-04-25 PROCEDURE — V2630 ANTER CHAMBER INTRAOCUL LENS: HCPCS | Performed by: OPHTHALMOLOGY

## 2022-04-25 PROCEDURE — 7100000011 HC PHASE II RECOVERY - ADDTL 15 MIN: Performed by: OPHTHALMOLOGY

## 2022-04-25 PROCEDURE — 3700000000 HC ANESTHESIA ATTENDED CARE: Performed by: OPHTHALMOLOGY

## 2022-04-25 PROCEDURE — 6360000002 HC RX W HCPCS: Performed by: NURSE ANESTHETIST, CERTIFIED REGISTERED

## 2022-04-25 PROCEDURE — 2580000003 HC RX 258: Performed by: ANESTHESIOLOGY

## 2022-04-25 PROCEDURE — 3700000001 HC ADD 15 MINUTES (ANESTHESIA): Performed by: OPHTHALMOLOGY

## 2022-04-25 DEVICE — IMPLANTABLE DEVICE: Type: IMPLANTABLE DEVICE | Site: EYE | Status: FUNCTIONAL

## 2022-04-25 RX ORDER — TROPICAMIDE 10 MG/ML
1 SOLUTION/ DROPS OPHTHALMIC SEE ADMIN INSTRUCTIONS
Status: DISCONTINUED | OUTPATIENT
Start: 2022-04-25 | End: 2022-04-25 | Stop reason: HOSPADM

## 2022-04-25 RX ORDER — SODIUM CHLORIDE 0.9 % (FLUSH) 0.9 %
5-40 SYRINGE (ML) INJECTION EVERY 12 HOURS SCHEDULED
Status: DISCONTINUED | OUTPATIENT
Start: 2022-04-25 | End: 2022-04-25 | Stop reason: HOSPADM

## 2022-04-25 RX ORDER — NEOMYCIN SULFATE, POLYMYXIN B SULFATE, AND DEXAMETHASONE 3.5; 10000; 1 MG/G; [USP'U]/G; MG/G
OINTMENT OPHTHALMIC PRN
Status: DISCONTINUED | OUTPATIENT
Start: 2022-04-25 | End: 2022-04-25 | Stop reason: ALTCHOICE

## 2022-04-25 RX ORDER — SODIUM CHLORIDE 9 MG/ML
INJECTION, SOLUTION INTRAVENOUS PRN
Status: DISCONTINUED | OUTPATIENT
Start: 2022-04-25 | End: 2022-04-25 | Stop reason: HOSPADM

## 2022-04-25 RX ORDER — SODIUM CHLORIDE, SODIUM LACTATE, POTASSIUM CHLORIDE, CALCIUM CHLORIDE 600; 310; 30; 20 MG/100ML; MG/100ML; MG/100ML; MG/100ML
INJECTION, SOLUTION INTRAVENOUS CONTINUOUS
Status: DISCONTINUED | OUTPATIENT
Start: 2022-04-25 | End: 2022-04-25 | Stop reason: HOSPADM

## 2022-04-25 RX ORDER — FENTANYL CITRATE 50 UG/ML
INJECTION, SOLUTION INTRAMUSCULAR; INTRAVENOUS PRN
Status: DISCONTINUED | OUTPATIENT
Start: 2022-04-25 | End: 2022-04-25 | Stop reason: SDUPTHER

## 2022-04-25 RX ORDER — PROPARACAINE HYDROCHLORIDE 5 MG/ML
1 SOLUTION/ DROPS OPHTHALMIC SEE ADMIN INSTRUCTIONS
Status: COMPLETED | OUTPATIENT
Start: 2022-04-25 | End: 2022-04-25

## 2022-04-25 RX ORDER — TETRACAINE HYDROCHLORIDE 5 MG/ML
SOLUTION OPHTHALMIC PRN
Status: DISCONTINUED | OUTPATIENT
Start: 2022-04-25 | End: 2022-04-25 | Stop reason: ALTCHOICE

## 2022-04-25 RX ORDER — BALANCED SALT SOLUTION 6.4; .75; .48; .3; 3.9; 1.7 MG/ML; MG/ML; MG/ML; MG/ML; MG/ML; MG/ML
SOLUTION OPHTHALMIC PRN
Status: DISCONTINUED | OUTPATIENT
Start: 2022-04-25 | End: 2022-04-25 | Stop reason: ALTCHOICE

## 2022-04-25 RX ORDER — MIDAZOLAM HYDROCHLORIDE 1 MG/ML
INJECTION INTRAMUSCULAR; INTRAVENOUS PRN
Status: DISCONTINUED | OUTPATIENT
Start: 2022-04-25 | End: 2022-04-25 | Stop reason: SDUPTHER

## 2022-04-25 RX ORDER — PHENYLEPHRINE HCL 2.5 %
1 DROPS OPHTHALMIC (EYE) SEE ADMIN INSTRUCTIONS
Status: DISCONTINUED | OUTPATIENT
Start: 2022-04-25 | End: 2022-04-25 | Stop reason: HOSPADM

## 2022-04-25 RX ORDER — SODIUM CHLORIDE 0.9 % (FLUSH) 0.9 %
5-40 SYRINGE (ML) INJECTION PRN
Status: DISCONTINUED | OUTPATIENT
Start: 2022-04-25 | End: 2022-04-25 | Stop reason: HOSPADM

## 2022-04-25 RX ORDER — OFLOXACIN 3 MG/ML
1 SOLUTION/ DROPS OPHTHALMIC ONCE
Status: COMPLETED | OUTPATIENT
Start: 2022-04-25 | End: 2022-04-25

## 2022-04-25 RX ADMIN — FENTANYL CITRATE 100 MCG: 50 INJECTION, SOLUTION INTRAMUSCULAR; INTRAVENOUS at 09:03

## 2022-04-25 RX ADMIN — PHENYLEPHRINE HYDROCHLORIDE 1 DROP: 25 SOLUTION/ DROPS OPHTHALMIC at 07:57

## 2022-04-25 RX ADMIN — PROPARACAINE HYDROCHLORIDE 1 DROP: 5 SOLUTION/ DROPS OPHTHALMIC at 07:58

## 2022-04-25 RX ADMIN — OFLOXACIN 1 DROP: 3 SOLUTION OPHTHALMIC at 07:56

## 2022-04-25 RX ADMIN — SODIUM CHLORIDE, POTASSIUM CHLORIDE, SODIUM LACTATE AND CALCIUM CHLORIDE: 600; 310; 30; 20 INJECTION, SOLUTION INTRAVENOUS at 08:00

## 2022-04-25 RX ADMIN — MIDAZOLAM 2 MG: 1 INJECTION INTRAMUSCULAR; INTRAVENOUS at 09:03

## 2022-04-25 RX ADMIN — TROPICAMIDE 1 DROP: 10 SOLUTION/ DROPS OPHTHALMIC at 07:57

## 2022-04-25 ASSESSMENT — PULMONARY FUNCTION TESTS
PIF_VALUE: 0

## 2022-04-25 ASSESSMENT — PAIN - FUNCTIONAL ASSESSMENT: PAIN_FUNCTIONAL_ASSESSMENT: NONE - DENIES PAIN

## 2022-04-25 NOTE — ANESTHESIA POSTPROCEDURE EVALUATION
Department of Anesthesiology  Postprocedure Note    Patient: Yesenia Black  MRN: [id number]  YOB: 1945  Date of evaluation: 4/25/2022  Time:  10:26 AM     Procedure Summary     Date: 04/25/22 Room / Location: [address] [place] [address] 02 / [facility]    Anesthesia Start: 0902 Anesthesia Stop: 4373    Procedure: PARS PLANA VITRECTOMY, ISERTION OF SULCUS LENS VERSE ACIOL, 25G, MAC, LEFT EYE (Lens needed is +17.0 MTA2U0 ACIOL) (CPT 29909) (Left Eye) Diagnosis: (APHAKIA LEFT EYE)    Surgeons: Sergio Guidry MD Responsible Provider: Ranjit Goldman MD    Anesthesia Type: MAC ASA Status: 3          Anesthesia Type: MAC    Yovani Phase I: Yovani Score: 10    Yovani Phase II: Yovani Score: 10    Last vitals: Reviewed and per EMR flowsheets.        Anesthesia Post Evaluation    Patient location during evaluation: PACU  Patient participation: complete - patient participated  Level of consciousness: awake  Airway patency: patent  Nausea & Vomiting: no nausea and no vomiting  Complications: no  Cardiovascular status: hemodynamically stable  Respiratory status: acceptable  Hydration status: euvolemic

## 2022-04-25 NOTE — LETTER
[facility] OR  [address] [place] [id number]  Phone: [phone]          April 25, 2022     Patient: Yesenia Black   YOB: 1945   Date of Visit: 04/25/22       To Whom it May Concern:    Mariah Rocha was seen today 04/25/22 at [facility]. Please excuse George Wheatley from school today, he was needed for his care. If you have any questions or concerns, please don't hesitate to call.     Sincerely,         Herrera Floyd RN

## 2022-05-06 ASSESSMENT — ENCOUNTER SYMPTOMS
ALLERGIC/IMMUNOLOGIC NEGATIVE: 1
EYES NEGATIVE: 1
SHORTNESS OF BREATH: 1
GASTROINTESTINAL NEGATIVE: 1

## 2022-05-06 NOTE — H&P
HISTORY AND PHYSICAL             Date: 04/25/2022        Patient Name: Shannon Crespo     YOB: 1945      Age:  78 y.o. Chief Complaint      Pt c/o decrease in vision - left eye. History Obtained From   patient    History of Present Illness   Pt has had previous eye surgeries with a lens removal.  He is aphakic so a new ACIOL will be placed today. Past Medical History     Past Medical History:   Diagnosis Date    Arthritis     COPD (chronic obstructive pulmonary disease) ([address]     Hyperlipidemia     Hypertension     Sleep apnea     uses cpap        Past Surgical History     Past Surgical History:   Procedure Laterality Date    EYE SURGERY Left 12/21/2020    PARS PLANA VITRECTOMY, SILICONE OIL REMOVAL, 63Y, MAC, LEFT EYE performed by Wallace Lai MD at [facility] Left 2010    RETINAL DETACHMENT SURGERY Right 2019    RETINAL DETACHMENT SURGERY Left 06/2020    and 8/14 /2020  done in [address] [place] [address].    in arm      VITRECTOMY Left 12/21/2020    VITRECTOMY Left 12/20/2021    PARS PLANA VITRECTOMY REMOVAL OF SILICONE OIL 13N MAC LEFT EYE (20026) performed by Wallace Lai MD at [address] [place] [address] Left 4/25/2022    PARS PLA[facility] VITRECTOMY, ISERTION OF SULCUS LENS VERSE ACIOL, 25G, MAC, LEFT EYE (Lens needed is +17.0 MTA2U0 ACIOL) (CPT 69610) performed by Wallace Lai MD at [address] [place] [address]        Medications Prior to Admission     Prior to Admission medications    Medication Sig Start Date End Date Taking?  Authorizing Provider   Coenzyme Q10 (CO Q 10 PO) Take 100 mg by mouth   Yes Historical Provider, MD   Fluticasone Furoate-Vilanterol (BREO ELLIPTA IN) Inhale into the lungs daily    Historical Provider, MD   atorvastatin (LIPITOR) 40 MG tablet Take 40 mg by mouth daily    Historical Provider, MD   amLODIPine (NORVASC) 10 MG tablet Take 10 mg by mouth daily    Historical Provider, MD   lisinopril (PRINIVIL;ZESTRIL) 40 MG tablet Take 40 mg by mouth daily    Historical Provider, MD   diclofenac-miSOPROStol (ARTHROTEC 50) 50-0.2 MG per tablet Take 1 tablet by mouth daily     Historical Provider, MD   acetaminophen (TYLENOL) 325 MG tablet Take 650 mg by mouth every 6 hours as needed for Pain    Historical Provider, MD        Allergies   Patient has no known allergies. Social History     Social History     Tobacco History     Smoking Status  Former Smoker Quit date  1/1/2002    Smokeless Tobacco Use  Current User Smokeless Tobacco Type  Snuff          Alcohol History     Alcohol Use Status  Yes Comment  occ          Drug Use     Drug Use Status  Not Asked          Sexual Activity     Sexually Active  Not Asked                Family History   No family history on file. Review of Systems   Review of Systems   Constitutional: Negative. HENT: Negative. Eyes: Negative. Respiratory: Positive for shortness of breath. Cardiovascular: Negative. Gastrointestinal: Negative. Endocrine: Negative. Genitourinary: Negative. Musculoskeletal: Negative. Skin: Negative. Allergic/Immunologic: Negative. Neurological: Negative. Hematological: Negative. Psychiatric/Behavioral: Negative. Physical Exam   BP (!) 160/69   Pulse 60   Temp 97.6 °F (36.4 °C) (Temporal)   Resp 16   Ht 5' 10\" (1.778 m)   Wt 237 lb 9.6 oz (107.8 kg)   SpO2 97%   BMI 34.09 kg/m²     Physical Exam  Vitals and nursing note reviewed. Exam conducted with a chaperone present. HENT:      Head: Normocephalic and atraumatic. Right Ear: Ear canal normal.      Left Ear: Ear canal normal.      Nose: Nose normal.      Mouth/Throat:      Mouth: Mucous membranes are moist.      Pharynx: Oropharynx is clear. Eyes:      Extraocular Movements: Extraocular movements intact. Cardiovascular:      Rate and Rhythm: Normal rate. Pulses: Normal pulses. Heart sounds: Normal heart sounds.    Abdominal: General: Bowel sounds are normal.   Genitourinary:     Comments: n/a  Musculoskeletal:         General: Normal range of motion. Cervical back: Normal range of motion. Skin:     General: Skin is warm and dry. Neurological:      Mental Status: He is alert and oriented to person, place, and time. Psychiatric:         Behavior: Behavior normal.         Labs    No results found for this or any previous visit (from the past 24 hour(s)). Imaging/Diagnostics Last 24 Hours   No results found. Assessment      Hospital Problems           Last Modified POA    Aphakia of eye, left 4/25/2022 Yes          Plan   1.  Vitrectomy, insertion of ACIOL - left eye    Consultations Ordered:  None    Electronically signed by Brooklynn Rasmussen PA-C on 5/6/22 at 1:27 PM EDT

## (undated) DEVICE — NEEDLE SYR 18GA L1.5IN RED PLAS HUB S STL BLNT FILL W/O

## (undated) DEVICE — NEEDLE RETROBLB 25GA L38MM STR SHFT DISP ATKNSN

## (undated) DEVICE — VITRECTOMY PACK 25 GA INPUT

## (undated) DEVICE — VISCOUS FLUID CONTROL PAK: Brand: CONSTELLATION

## (undated) DEVICE — PACK PROCEDURE SURG RETINAL ST ES CUST

## (undated) DEVICE — STOPCOCK IV PRIMING 0.26ML 3 W W/ TWO FEM LUERLOK PRT 1

## (undated) DEVICE — SHIELD EYE W3XL2.5IN UNIV CLR PLAS LTWT

## (undated) DEVICE — I-KNIFE CUTTING INSTRUMENT MACKOOL SIDEPORT KNIFE: Brand: I-KNIFE

## (undated) DEVICE — SYRINGE, LUER LOCK, 10ML: Brand: MEDLINE

## (undated) DEVICE — GLOVE SURG SZ 75 L12IN FNGR THK94MIL STD WHT LTX FREE

## (undated) DEVICE — 1060 S-DRAPE SPCL INCISE 10/BX 4BX/CS: Brand: STERI-DRAPE™

## (undated) DEVICE — CANNULA 25GA SOFT TIP 1.0MM SIL TIP

## (undated) DEVICE — PROBE LSR 25GA DIR FOR VISION

## (undated) DEVICE — SILIKON™ 1000 (PURIFIED POLYDIMETHYLSILOXANE) IS HIGHLY PURIFIED LONG CHAIN POLYDIMETHYLSILOXANE TRIMETHYLSILOXY TERMINATED SILICONE OIL. IT IS STERILE,NON-PYROGENIC, CLEAR, COLORLESS AND HAS A VISCOSITY OF 1000 CS. FOR USE AS A POST-OPERATIVE RETINAL TAMPONADE DURING VITREORETINAL SURGERY. SILIKON™ 1000IS COMPOSED OF SILICON, OXYGEN, CARBON AND HYDROGEN ATOMS. SILIKON™ 1000 IS IMMISCIBLE WITH AQUEOUS COMPONENTS AND IS RELATIVELY INERT MATERIAL WITHLITTLE BIOLOGICAL TOXICITY POTENTIAL.: Brand: SILIKON™